# Patient Record
Sex: MALE
[De-identification: names, ages, dates, MRNs, and addresses within clinical notes are randomized per-mention and may not be internally consistent; named-entity substitution may affect disease eponyms.]

---

## 2022-06-13 ENCOUNTER — NON-APPOINTMENT (OUTPATIENT)
Age: 27
End: 2022-06-13

## 2023-04-17 PROBLEM — Z00.00 ENCOUNTER FOR PREVENTIVE HEALTH EXAMINATION: Status: ACTIVE | Noted: 2023-04-17

## 2023-04-19 ENCOUNTER — APPOINTMENT (OUTPATIENT)
Dept: UROLOGY | Facility: CLINIC | Age: 28
End: 2023-04-19
Payer: COMMERCIAL

## 2023-04-19 VITALS
HEIGHT: 71 IN | BODY MASS INDEX: 26.6 KG/M2 | HEART RATE: 68 BPM | OXYGEN SATURATION: 99 % | WEIGHT: 190 LBS | DIASTOLIC BLOOD PRESSURE: 78 MMHG | SYSTOLIC BLOOD PRESSURE: 116 MMHG

## 2023-04-19 DIAGNOSIS — N48.1 BALANITIS: ICD-10-CM

## 2023-04-19 PROCEDURE — 99204 OFFICE O/P NEW MOD 45 MIN: CPT

## 2023-04-19 RX ORDER — FLUCONAZOLE 150 MG/1
150 TABLET ORAL DAILY
Qty: 5 | Refills: 0 | Status: ACTIVE | COMMUNITY
Start: 2023-04-19 | End: 1900-01-01

## 2023-04-21 NOTE — HISTORY OF PRESENT ILLNESS
[FreeTextEntry1] : Mr. EVERETT is a 27 year  Unknown  M who comes today for itchiness and pain in the glans for about 2 weeks. Patient is circumcised.  Has been treating it with terbinafine cream with partial response. \par Denies LUTS, fevers, chills, hematuria, flank pain. He has never smoked. \par No family history of Prostate cancer.\par

## 2023-04-21 NOTE — PHYSICAL EXAM
[General Appearance - Well Developed] : well developed [General Appearance - Well Nourished] : well nourished [Normal Appearance] : normal appearance [Well Groomed] : well groomed [General Appearance - In No Acute Distress] : no acute distress [Edema] : no peripheral edema [Respiration, Rhythm And Depth] : normal respiratory rhythm and effort [Exaggerated Use Of Accessory Muscles For Inspiration] : no accessory muscle use [Abdomen Soft] : soft [Abdomen Tenderness] : non-tender [Costovertebral Angle Tenderness] : no ~M costovertebral angle tenderness [Urethral Meatus] : meatus normal [Penis Abnormality] : normal circumcised penis [Urinary Bladder Findings] : the bladder was normal on palpation [Scrotum] : the scrotum was normal [Testes Mass (___cm)] : there were no testicular masses [Normal Station and Gait] : the gait and station were normal for the patient's age [No Focal Deficits] : no focal deficits [] : no rash [Oriented To Time, Place, And Person] : oriented to person, place, and time [Affect] : the affect was normal [Mood] : the mood was normal [Not Anxious] : not anxious [No Palpable Adenopathy] : no palpable adenopathy [FreeTextEntry1] : papules over glans a prepuce remnant

## 2023-04-21 NOTE — ASSESSMENT
[FreeTextEntry1] : Mr. EVERETT is a 27 year  Unknown  M with balanitis. Will treat with oral and topical antifungal.\par \par Mr. EVERETT is a 27 year  Unknown  M who comes today to clinic for\par Denies LUTS, fevers, chills, hematuria, flank pain. He has never smoked. \par No family history of Prostate cancer.\par \par \par We discussed the the different possible presentations of UTIs/STDs in males including but not limited to urethritis, prostatitis, orchitis, genital ulcers, genital warts, inguinal lymphadenopathy, balanitis, papules, blisters.\par \par Infectious urethritis/prostatitis is typically caused by a sexually transmitted pathogen; thus, most cases are seen in young, sexually active men. Neisseria gonorrhoeae and Chlamydia trachomatis are commonly identified in cases of urethritis. Mycoplasma genitalium has also been strongly associated with urethritis. Dysuria, or discomfort with urination, is usually the chief complaint in males with urethritis and is reported in the majority of males with gonorrhea and over half of patients with nongonococcal urethritis. Other complaints include pruritus, burning, and discharge at the urethral meatus.\par \par Balanitis refers to inflammation of the glans penis. When the prepuce (foreskin) also becomes involved, the condition is known as balanoposthitis. Balanitis has a wide range of causes, but most cases are related to inadequate hygiene in uncircumcised men. When the foreskin is not routinely retracted and the glans is not cleansed in an appropriate fashion, buildup of sweat, debris, exfoliated skin, and bacteria or fungi can occur, resulting in inflammation. Predisposing factors include diabetes mellitus, trauma (eg, zipper injury), obesity, and edematous conditions (eg, congestive heart failure, cirrhosis, nephrotic syndrome). Of cases with identifiable etiologies, candidal infection is the most common. Various other infectious agents, dermatologic conditions, and premalignant conditions have been associated with balanitis. Chronic balanitis may predispose to premalignant and malignant lesions, but there is contradictory evidence on this topic. As such, a penile biopsy may be required when balanitis is resistant to initially therapy.\par Symptomatic candidal infection typically presents with a painful or pruritic erythematous rash; a white curd-like exudate is sometimes present. Pruritus and burning are most noticeable after sexual intercourse. Physical examination may reveal the presence of small papules with blotchy erythema and an eroded, dry, or glazed appearance.\par The development of balanitis or phimosis in an otherwise healthy man can be the first presentation of diabetes mellitus. Poorly controlled blood glucose is associated with proliferation of candidal species beneath the foreskin, which may lead to balanitis. \par Anaerobes have been reported as a cause of balanitis (with mixed species, which may include Gardnerella vaginalis), especially in uncircumcised males [4]. Subpreputial anaerobic infection can result in a foul-smelling discharge, inflammation and edema of the glans/foreskin, erosive lesions, and inguinal lymphadenopathy. Group A streptococcus and Staphylococcus aureus, which are common skin pathogens, can also be responsible for balanitis. They present with erythema and edema. Sexually transmitted pathogens, including Trichomonas vaginalis, HSV, human papillomavirus (HPV), syphilis, scabies, and Mycoplasma genitalium, may also cause this condition.\par \par Acute epididymitis is the most common cause of scrotal pain in adults featured by testicular pain, swelling, and tenderness (epididymo-orchitis). It is most commonly infectious in etiology but can also be from noninfectious causes such as trauma and autoimmune diseases. N. gonorrhoeae and C. trachomatis are the most common organisms responsible for acute epididymitis in men under the age. Escherichia coli, other coliforms, and Pseudomonas species are more frequent in older men, often in association with obstructive uropathy from benign prostatic hyperplasia. Men of any age who engage in insertive anal intercourse are also at increased risk for acute bacterial epididymitis from exposure to coliform bacteria in the rectum. Other less common organisms responsible for acute epididymitis include Ureaplasma species, Mycoplasma genitalium. \par \par \par \par \par

## 2023-04-24 ENCOUNTER — APPOINTMENT (OUTPATIENT)
Dept: UROLOGY | Facility: CLINIC | Age: 28
End: 2023-04-24

## 2024-01-31 ENCOUNTER — APPOINTMENT (OUTPATIENT)
Dept: UROLOGY | Facility: CLINIC | Age: 29
End: 2024-01-31
Payer: COMMERCIAL

## 2024-01-31 DIAGNOSIS — N50.819 TESTICULAR PAIN, UNSPECIFIED: ICD-10-CM

## 2024-01-31 PROCEDURE — 99213 OFFICE O/P EST LOW 20 MIN: CPT

## 2024-01-31 NOTE — ASSESSMENT
[FreeTextEntry1] : 27-year-old with right testicular pain.  Discussed with the patient the possible causes of testicular pain including but not limited to orchitis, intermittent torsion, epididymal cyst torsion.  Will obtain urine test and ultrasound.  Patient will let me know if pain returns.  We discussed that acute epididymitis is the most common cause of scrotal pain in adults featured by testicular pain, swelling, and tenderness (epididymo-orchitis). It is most commonly infectious in etiology but can also be from noninfectious causes such as trauma and autoimmune diseases. N. gonorrhoeae and C. trachomatis are the most common organisms responsible for acute epididymitis in men under the age. Escherichia coli, other coliforms, and Pseudomonas species are more frequent in older men, often in association with obstructive uropathy from benign prostatic hyperplasia. Men of any age who engage in insertive anal intercourse are also at increased risk for acute bacterial epididymitis from exposure to coliform bacteria in the rectum. Other less common organisms responsible for acute epididymitis include Ureaplasma species, Mycoplasma genitalium.

## 2024-01-31 NOTE — HISTORY OF PRESENT ILLNESS
[FreeTextEntry1] : Mr. EVERETT is a 27 year Unknown  M presented 10 months ago with balanitis which had resolved with fluconazole therapy.  Patient now endorsing an episode of right testicular pain and lasted for about 48 hours.  Deficit this patient has not felt testicular pain.  Is the first time that it happens.  Ultrasound done at [facility] both testicles normal trophy and adequate blood supply. Denies LUTS, fevers, chills, hematuria, flank pain. He has never smoked.  No family history of Prostate cancer.

## 2024-02-02 LAB
APPEARANCE: CLEAR
BACTERIA UR CULT: NORMAL
BACTERIA: NEGATIVE /HPF
BILIRUBIN URINE: NEGATIVE
BLOOD URINE: NEGATIVE
C TRACH RRNA SPEC QL NAA+PROBE: NOT DETECTED
CAST: 0 /LPF
COLOR: YELLOW
EPITHELIAL CELLS: 0 /HPF
GLUCOSE QUALITATIVE U: NEGATIVE MG/DL
KETONES URINE: NEGATIVE MG/DL
LEUKOCYTE ESTERASE URINE: NEGATIVE
MICROSCOPIC-UA: NORMAL
N GONORRHOEA RRNA SPEC QL NAA+PROBE: NOT DETECTED
NITRITE URINE: NEGATIVE
PH URINE: 7
PROTEIN URINE: NEGATIVE MG/DL
RED BLOOD CELLS URINE: 0 /HPF
SOURCE AMPLIFICATION: NORMAL
SPECIFIC GRAVITY URINE: 1.01
UROBILINOGEN URINE: 0.2 MG/DL
WHITE BLOOD CELLS URINE: 0 /HPF

## 2024-02-07 LAB
MYCOPLASMA HOMINIS CULTURE: NEGATIVE
UREAPLASMA CULTURE: NEGATIVE

## 2025-06-11 ENCOUNTER — APPOINTMENT (OUTPATIENT)
Dept: ORTHOPEDIC SURGERY | Facility: CLINIC | Age: 30
End: 2025-06-11
Payer: COMMERCIAL

## 2025-06-11 VITALS — WEIGHT: 180 LBS | HEIGHT: 71 IN | BODY MASS INDEX: 25.2 KG/M2

## 2025-06-11 PROCEDURE — 73610 X-RAY EXAM OF ANKLE: CPT | Mod: 50

## 2025-06-11 PROCEDURE — 99203 OFFICE O/P NEW LOW 30 MIN: CPT
